# Patient Record
Sex: FEMALE | Race: WHITE | NOT HISPANIC OR LATINO | URBAN - METROPOLITAN AREA
[De-identification: names, ages, dates, MRNs, and addresses within clinical notes are randomized per-mention and may not be internally consistent; named-entity substitution may affect disease eponyms.]

---

## 2024-04-29 ENCOUNTER — OFFICE (OUTPATIENT)
Dept: URBAN - METROPOLITAN AREA CLINIC 11 | Facility: CLINIC | Age: 29
End: 2024-04-29

## 2024-04-29 VITALS
SYSTOLIC BLOOD PRESSURE: 147 MMHG | WEIGHT: 181 LBS | OXYGEN SATURATION: 93 % | DIASTOLIC BLOOD PRESSURE: 88 MMHG | DIASTOLIC BLOOD PRESSURE: 74 MMHG | HEART RATE: 99 BPM | SYSTOLIC BLOOD PRESSURE: 153 MMHG | HEIGHT: 65 IN

## 2024-04-29 DIAGNOSIS — K92.1 MELENA: ICD-10-CM

## 2024-04-29 DIAGNOSIS — R45.89 OTHER SYMPTOMS AND SIGNS INVOLVING EMOTIONAL STATE: ICD-10-CM

## 2024-04-29 DIAGNOSIS — Z91.018 ALLERGY TO OTHER FOODS: ICD-10-CM

## 2024-04-29 DIAGNOSIS — R10.32 LEFT LOWER QUADRANT PAIN: ICD-10-CM

## 2024-04-29 DIAGNOSIS — K64.9 UNSPECIFIED HEMORRHOIDS: ICD-10-CM

## 2024-04-29 PROCEDURE — 99204 OFFICE O/P NEW MOD 45 MIN: CPT | Performed by: STUDENT IN AN ORGANIZED HEALTH CARE EDUCATION/TRAINING PROGRAM

## 2024-04-29 RX ORDER — SODIUM PICOSULFATE, MAGNESIUM OXIDE, AND ANHYDROUS CITRIC ACID 12; 3.5; 1 G/175ML; G/175ML; MG/175ML
LIQUID ORAL
Qty: 1 | Refills: 0 | Status: COMPLETED
Start: 2024-04-29 | End: 2024-07-18

## 2024-04-29 RX ORDER — HYDROCORTISONE ACETATE 25 MG/1
SUPPOSITORY RECTAL
Qty: 14 | Refills: 1 | Status: COMPLETED
Start: 2024-04-29 | End: 2024-05-10

## 2024-04-29 NOTE — SERVICENOTES
We will schedule the patient for diagnostic colonoscopy given her new onset hematochezia.  I am going to treat her empirically for hemorrhoidal bleeding with some Anusol suppositories.  Given her multiple food allergies I am also going to check her for celiac disease, blood counts, and liver enzymes.  Discussed with the patient the risks and benefits of the procedure and she agrees to proceed.  Her left lower quadrant pain is not really bothering her unless she presses on her abdomen very deeply so I think that has keep an eye on this and see what her colonoscopy shows then we can use dicyclomine as needed.  Return to clinic in 6 months or sooner if needed.  I highly recommended that she see a primary care doctor to help treat her anxiety as I think she could benefit from this greatly.

## 2024-04-29 NOTE — SERVICEHPINOTES
Ms. Tania Carrillo is a 29-year-old white female with no significant past medical history, who presents to [place] [address] for hematochezia.  
rand
br Clinic visit 04/29/2024: 
br Patient reports that for the last 2 years she has had on and off rectal bleeding which has gotten worse over the last few weeks.  She also has some left lower quadrant pain that is really worrying her but it is only when she presses very firmly and deeply on her left lower quadrant.  Normally it does not really bother her during her everyday life.  She never had surgery on her abdomen.  No family history of GI malignancies or liver disease.  No previous EGD or colonoscopy.  She does smoke marijuana about 3 times a day and has lot of anxiety about her health.  She does feel some protrusions on the outside of her bottom suspicious for hemorrhoids.  She has allergies to oats, barley, or beans, strawberries, and walnuts so she finds it hard to do a high-fiber diet.  She drinks alcohol occasionally.  She does not have any constipation and has about 3 bowel movements per day but sometimes has to strain.

## 2024-05-31 ENCOUNTER — AMBULATORY SURGICAL CENTER (OUTPATIENT)
Dept: URBAN - METROPOLITAN AREA SURGERY 3 | Facility: SURGERY | Age: 29
End: 2024-05-31
Payer: COMMERCIAL

## 2024-05-31 ENCOUNTER — OFFICE (OUTPATIENT)
Dept: URBAN - METROPOLITAN AREA PATHOLOGY 12 | Facility: PATHOLOGY | Age: 29
End: 2024-05-31

## 2024-05-31 VITALS
SYSTOLIC BLOOD PRESSURE: 100 MMHG | HEART RATE: 90 BPM | SYSTOLIC BLOOD PRESSURE: 114 MMHG | OXYGEN SATURATION: 99 % | TEMPERATURE: 98.7 F | OXYGEN SATURATION: 100 % | RESPIRATION RATE: 14 BRPM | SYSTOLIC BLOOD PRESSURE: 100 MMHG | DIASTOLIC BLOOD PRESSURE: 68 MMHG | DIASTOLIC BLOOD PRESSURE: 73 MMHG | RESPIRATION RATE: 16 BRPM | RESPIRATION RATE: 16 BRPM | HEART RATE: 102 BPM | WEIGHT: 177.6 LBS | DIASTOLIC BLOOD PRESSURE: 73 MMHG | HEART RATE: 99 BPM | DIASTOLIC BLOOD PRESSURE: 73 MMHG | HEART RATE: 89 BPM | TEMPERATURE: 98.7 F | RESPIRATION RATE: 17 BRPM | SYSTOLIC BLOOD PRESSURE: 100 MMHG | SYSTOLIC BLOOD PRESSURE: 130 MMHG | HEIGHT: 65 IN | RESPIRATION RATE: 16 BRPM | DIASTOLIC BLOOD PRESSURE: 68 MMHG | OXYGEN SATURATION: 98 % | HEART RATE: 90 BPM | TEMPERATURE: 98.7 F | SYSTOLIC BLOOD PRESSURE: 132 MMHG | HEART RATE: 99 BPM | OXYGEN SATURATION: 100 % | RESPIRATION RATE: 14 BRPM | RESPIRATION RATE: 15 BRPM | HEART RATE: 99 BPM | DIASTOLIC BLOOD PRESSURE: 83 MMHG | HEART RATE: 102 BPM | WEIGHT: 177.6 LBS | RESPIRATION RATE: 14 BRPM | DIASTOLIC BLOOD PRESSURE: 83 MMHG | HEART RATE: 96 BPM | SYSTOLIC BLOOD PRESSURE: 132 MMHG | SYSTOLIC BLOOD PRESSURE: 130 MMHG | SYSTOLIC BLOOD PRESSURE: 132 MMHG | HEIGHT: 65 IN | HEART RATE: 89 BPM | SYSTOLIC BLOOD PRESSURE: 117 MMHG | DIASTOLIC BLOOD PRESSURE: 54 MMHG | SYSTOLIC BLOOD PRESSURE: 117 MMHG | SYSTOLIC BLOOD PRESSURE: 130 MMHG | SYSTOLIC BLOOD PRESSURE: 117 MMHG | DIASTOLIC BLOOD PRESSURE: 61 MMHG | HEART RATE: 90 BPM | DIASTOLIC BLOOD PRESSURE: 83 MMHG | DIASTOLIC BLOOD PRESSURE: 54 MMHG | WEIGHT: 177.6 LBS | DIASTOLIC BLOOD PRESSURE: 54 MMHG | DIASTOLIC BLOOD PRESSURE: 61 MMHG | OXYGEN SATURATION: 99 % | RESPIRATION RATE: 15 BRPM | DIASTOLIC BLOOD PRESSURE: 68 MMHG | HEART RATE: 96 BPM | SYSTOLIC BLOOD PRESSURE: 114 MMHG | RESPIRATION RATE: 17 BRPM | HEART RATE: 102 BPM | OXYGEN SATURATION: 99 % | DIASTOLIC BLOOD PRESSURE: 61 MMHG | RESPIRATION RATE: 17 BRPM | OXYGEN SATURATION: 98 % | OXYGEN SATURATION: 100 % | HEIGHT: 65 IN | HEART RATE: 96 BPM | SYSTOLIC BLOOD PRESSURE: 114 MMHG | RESPIRATION RATE: 15 BRPM | OXYGEN SATURATION: 98 % | HEART RATE: 89 BPM

## 2024-05-31 DIAGNOSIS — K63.5 POLYP OF COLON: ICD-10-CM

## 2024-05-31 DIAGNOSIS — K92.1 MELENA: ICD-10-CM

## 2024-05-31 PROCEDURE — 88305 TISSUE EXAM BY PATHOLOGIST: CPT | Performed by: STUDENT IN AN ORGANIZED HEALTH CARE EDUCATION/TRAINING PROGRAM

## 2024-05-31 PROCEDURE — 45380 COLONOSCOPY AND BIOPSY: CPT | Performed by: STUDENT IN AN ORGANIZED HEALTH CARE EDUCATION/TRAINING PROGRAM

## 2024-05-31 NOTE — SERVICEHPINOTES
Ms. Tania Carrillo is a 29-year-old white female with no significant past medical history, who initially presented to [place] [address] for hematochezia. Clinic visit 04/29/2024: brPatient reports that for the last 2 years she has had on and off rectal bleeding which has gotten worse over the last few weeks.  She also has some left lower quadrant pain that is really worrying her but it is only when she presses very firmly and deeply on her left lower quadrant.  Normally it does not really bother her during her everyday life.  She never had surgery on her abdomen.  No family history of GI malignancies or liver disease.  No previous EGD or colonoscopy.  She does smoke marijuana about 3 times a day and has lot of anxiety about her health.  She does feel some protrusions on the outside of her bottom suspicious for hemorrhoids.  She has allergies to oats, barley, or beans, strawberries, and walnuts so she finds it hard to do a high-fiber diet.  She drinks alcohol occasionally.  She does not have any constipation and has about 3 bowel movements per day but sometimes has to strain.
br
br   Colonoscopy 05/31/2024:  
rand
no bleeding since starting the prep, no PSH, no FHx of colon cancer

## 2024-05-31 NOTE — SERVICEHPINOTES
Ms. Tania Carrillo is a 29-year-old white female with no significant past medical history, who initially presented to [place] [address] for hematochezia. Clinic visit 04/29/2024: brPatient reports that for the last 2 years she has had on and off rectal bleeding which has gotten worse over the last few weeks.  She also has some left lower quadrant pain that is really worrying her but it is only when she presses very firmly and deeply on her left lower quadrant.  Normally it does not really bother her during her everyday life.  She never had surgery on her abdomen.  No family history of GI malignancies or liver disease.  No previous EGD or colonoscopy.  She does smoke marijuana about 3 times a day and has lot of anxiety about her health.  She does feel some protrusions on the outside of her bottom suspicious for hemorrhoids.  She has allergies to oats, barley, or beans, strawberries, and walnuts so she finds it hard to do a high-fiber diet.  She drinks alcohol occasionally.  She does not have any constipation and has about 3 bowel movements per day but sometimes has to strain.
br
br   Colonoscopy 05/31/2024:  
rand
no bleeding since starting the prep, no PSH, no FHx of colon cancer

## 2024-05-31 NOTE — SERVICEHPINOTES
Ms. Tania Carrillo is a 29-year-old white female with no significant past medical history, who initially presented to [place] [address] for hematochezia. Clinic visit 04/29/2024: brPatient reports that for the last 2 years she has had on and off rectal bleeding which has gotten worse over the last few weeks.  She also has some left lower quadrant pain that is really worrying her but it is only when she presses very firmly and deeply on her left lower quadrant.  Normally it does not really bother her during her everyday life.  She never had surgery on her abdomen.  No family history of GI malignancies or liver disease.  No previous EGD or colonoscopy.  She does smoke marijuana about 3 times a day and has lot of anxiety about her health.  She does feel some protrusions on the outside of her bottom suspicious for hemorrhoids.  She has allergies to oats, barley, or beans, strawberries, and walnuts so she finds it hard to do a high-fiber diet.  She drinks alcohol occasionally.  She does not have any constipation and has about 3 bowel movements per day but sometimes has to strain.
br
br   Colonoscopy 05/31/2024:  
rand
no bleeding since starting the prep, no PSH, no FHx of colon cancer

## 2024-06-03 LAB
GASTRO ONE PATHOLOGY: PDF REPORT: (no result)

## 2024-07-18 ENCOUNTER — OFFICE (OUTPATIENT)
Dept: URBAN - METROPOLITAN AREA CLINIC 11 | Facility: CLINIC | Age: 29
End: 2024-07-18

## 2024-07-18 VITALS
WEIGHT: 180 LBS | OXYGEN SATURATION: 99 % | HEART RATE: 85 BPM | HEIGHT: 65 IN | SYSTOLIC BLOOD PRESSURE: 137 MMHG | DIASTOLIC BLOOD PRESSURE: 87 MMHG

## 2024-07-18 DIAGNOSIS — R45.89 OTHER SYMPTOMS AND SIGNS INVOLVING EMOTIONAL STATE: ICD-10-CM

## 2024-07-18 DIAGNOSIS — K75.81 NONALCOHOLIC STEATOHEPATITIS (NASH): ICD-10-CM

## 2024-07-18 DIAGNOSIS — K64.9 UNSPECIFIED HEMORRHOIDS: ICD-10-CM

## 2024-07-18 PROCEDURE — 99214 OFFICE O/P EST MOD 30 MIN: CPT

## 2024-07-18 NOTE — SERVICEHPINOTES
Ms. Tania Carrillo is a 29-year-old white female with no significant past medical history, who presents to [place] [address] for hematochezia. Clinic visit 04/29/2024: brPatient reports that for the last 2 years she has had on and off rectal bleeding which has gotten worse over the last few weeks. She also has some left lower quadrant pain that is really worrying her but it is only when she presses very firmly and deeply on her left lower quadrant. Normally it does not really bother her during her everyday life. She never had surgery on her abdomen. No family history of GI malignancies or liver disease. No previous EGD or colonoscopy. She does smoke marijuana about 3 times a day and has lot of anxiety about her health. She does feel some protrusions on the outside of her bottom suspicious for hemorrhoids. She has allergies to oats, barley, or beans, strawberries, and walnuts so she finds it hard to do a high-fiber diet. She drinks alcohol occasionally. She does not have any constipation and has about 3 bowel movements per day but sometimes has to strain.
rand gordillo Colonoscopy 5/31/2024:
br2 Polyps (2 mm to 3 mm) in the transverse colon- Benign tissue. Internal and external hemorrhoids.
rand gordillo Clinic Visit 7/18/2024: 
Chris is here today for evaluation of nonalcoholic fatty liver which was an incidental finding on abdominal ultrasound.   She recently was diagnosed with left ovarian cyst which has since ruptured and she no longer has left lower abdominal pain.  She endorses 2-3 formed bowel movements per day.  She still has some rectal bleeding from the hemorrhoids. The hemorrhoidal medications that have been sent in for her have not been covered under her insurance so she has been using over-the-counter medication.  She denies any nausea vomiting or abdominal pain.  She denies any dyspepsia or dysphagia. She drinks alcohol maybe 1-2 times per week and smokes marijuana 2-3 times per day.   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 No family history of liver disease.rand gordillo

## 2024-07-18 NOTE — SERVICENOTES
Will do a chronic liver disease workup to evaluate with hepatic steatosis.  her liver enzymes were normal in April.   Her celiac panel was negative in April as well.  She can continue using hydrocortisone for her external hemorrhoids.  Continue avoiding NSAIDs.  Discussed weight loss and alcohol reduction though she does not drink often.  We will have her follow-up in 6 months or sooner if needed.

## 2024-07-22 LAB
ACTIN (SMOOTH MUSCLE) ANTIBODY: 3 UNITS (ref 0–19)
ACUTE HEPATITIS: HBSAG SCREEN: NEGATIVE
ACUTE HEPATITIS: HCV AB: NON REACTIVE
ACUTE HEPATITIS: HEP A AB, IGM: NEGATIVE
ACUTE HEPATITIS: HEP B CORE AB, IGM: NEGATIVE
ANA BY IFA RFX TITER/PATTERN: NEGATIVE
CBC, PLATELET, NO DIFFERENTIAL: HEMATOCRIT: 42.3 % (ref 34–46.6)
CBC, PLATELET, NO DIFFERENTIAL: HEMOGLOBIN: 13.6 G/DL (ref 11.1–15.9)
CBC, PLATELET, NO DIFFERENTIAL: MCH: 27.3 PG (ref 26.6–33)
CBC, PLATELET, NO DIFFERENTIAL: MCHC: 32.2 G/DL (ref 31.5–35.7)
CBC, PLATELET, NO DIFFERENTIAL: MCV: 85 FL (ref 79–97)
CBC, PLATELET, NO DIFFERENTIAL: PLATELETS: 403 X10E3/UL (ref 150–450)
CBC, PLATELET, NO DIFFERENTIAL: RBC: 4.98 X10E6/UL (ref 3.77–5.28)
CBC, PLATELET, NO DIFFERENTIAL: RDW: 12.1 % (ref 11.7–15.4)
CBC, PLATELET, NO DIFFERENTIAL: WBC: 12.5 X10E3/UL — HIGH (ref 3.4–10.8)
CERULOPLASMIN: 30.1 MG/DL (ref 19–39)
COMP. METABOLIC PANEL (14): ALBUMIN: 4.8 G/DL (ref 4–5)
COMP. METABOLIC PANEL (14): ALKALINE PHOSPHATASE: 68 IU/L (ref 44–121)
COMP. METABOLIC PANEL (14): ALT (SGPT): 24 IU/L (ref 0–32)
COMP. METABOLIC PANEL (14): AST (SGOT): 16 IU/L (ref 0–40)
COMP. METABOLIC PANEL (14): BILIRUBIN, TOTAL: <0.2 MG/DL
COMP. METABOLIC PANEL (14): BUN/CREATININE RATIO: 14 (ref 9–23)
COMP. METABOLIC PANEL (14): BUN: 9 MG/DL (ref 6–20)
COMP. METABOLIC PANEL (14): CALCIUM: 9.9 MG/DL (ref 8.7–10.2)
COMP. METABOLIC PANEL (14): CARBON DIOXIDE, TOTAL: 21 MMOL/L (ref 20–29)
COMP. METABOLIC PANEL (14): CHLORIDE: 103 MMOL/L (ref 96–106)
COMP. METABOLIC PANEL (14): CREATININE: 0.66 MG/DL (ref 0.57–1)
COMP. METABOLIC PANEL (14): EGFR: 122 ML/MIN/1.73 (ref 59–?)
COMP. METABOLIC PANEL (14): GLOBULIN, TOTAL: 2.8 G/DL (ref 1.5–4.5)
COMP. METABOLIC PANEL (14): GLUCOSE: 90 MG/DL (ref 70–99)
COMP. METABOLIC PANEL (14): POTASSIUM: 4.6 MMOL/L (ref 3.5–5.2)
COMP. METABOLIC PANEL (14): PROTEIN, TOTAL: 7.6 G/DL (ref 6–8.5)
COMP. METABOLIC PANEL (14): SODIUM: 139 MMOL/L (ref 134–144)
FE+TIBC+FER: FERRITIN: 24 NG/ML (ref 15–150)
FE+TIBC+FER: IRON BIND.CAP.(TIBC): 443 UG/DL (ref 250–450)
FE+TIBC+FER: IRON SATURATION: 9 % — CRITICAL LOW (ref 15–55)
FE+TIBC+FER: IRON: 39 UG/DL (ref 27–159)
FE+TIBC+FER: UIBC: 404 UG/DL (ref 131–425)
HEP A AB, TOTAL: NEGATIVE
HEP B SURFACE AB, QUAL: REACTIVE
INTERPRETATION: (no result)
MITOCHONDRIAL (M2) ANTIBODY: <20 UNITS

## 2024-07-24 ENCOUNTER — OFFICE (OUTPATIENT)
Dept: URBAN - METROPOLITAN AREA CLINIC 11 | Facility: CLINIC | Age: 29
End: 2024-07-24

## 2024-07-24 VITALS
DIASTOLIC BLOOD PRESSURE: 103 MMHG | HEIGHT: 65 IN | HEART RATE: 137 BPM | SYSTOLIC BLOOD PRESSURE: 161 MMHG | WEIGHT: 177 LBS | OXYGEN SATURATION: 97 %

## 2024-07-24 DIAGNOSIS — K62.89 OTHER SPECIFIED DISEASES OF ANUS AND RECTUM: ICD-10-CM

## 2024-07-24 DIAGNOSIS — K64.9 UNSPECIFIED HEMORRHOIDS: ICD-10-CM

## 2024-07-24 DIAGNOSIS — K60.2 ANAL FISSURE, UNSPECIFIED: ICD-10-CM

## 2024-07-24 PROCEDURE — 99214 OFFICE O/P EST MOD 30 MIN: CPT

## 2024-07-24 NOTE — SERVICENOTES
Large external hemorrhoid noted with small ulceration on the end of it.  Possibly an anal fissure due to the extreme pain during defecation so will send her in nitro/lidocaine.  Also referring her to   who agreed to work the patient  into clinic soon. Encouraged her to use stool softener, MiraLax and drink plenty of water to help reduce irritation on the hemorrhoids.  All questions addressed will have her follow-up in 8 weeks to recheck labs to see if her iron saturation has improved.

## 2025-01-29 ENCOUNTER — OFFICE (OUTPATIENT)
Dept: URBAN - METROPOLITAN AREA CLINIC 11 | Facility: CLINIC | Age: 30
End: 2025-01-29
Payer: COMMERCIAL

## 2025-01-29 VITALS
HEART RATE: 100 BPM | DIASTOLIC BLOOD PRESSURE: 71 MMHG | HEIGHT: 65 IN | WEIGHT: 183 LBS | OXYGEN SATURATION: 99 % | SYSTOLIC BLOOD PRESSURE: 123 MMHG

## 2025-01-29 DIAGNOSIS — Z87.19 PERSONAL HISTORY OF OTHER DISEASES OF THE DIGESTIVE SYSTEM: ICD-10-CM

## 2025-01-29 DIAGNOSIS — K64.9 UNSPECIFIED HEMORRHOIDS: ICD-10-CM

## 2025-01-29 PROCEDURE — 99213 OFFICE O/P EST LOW 20 MIN: CPT

## 2025-01-29 NOTE — SERVICENOTES
patient is doing very well and her anal fissure has resolved and her hemorrhoids are no longer inflamed.  She just has a small skin tag that she feels on occasion.  She will occasionally use the nitroglycerin cream if she feels any discomfort after a bowel movement.  Otherwise she has no GI complaints.  We will have her follow-up as needed.  Encouraged her to eat a high-fiber diet  and use MiraLax as needed for any constipation.  Counseled her on NSAID use.  All questions addressed.

## 2025-01-29 NOTE — SERVICEHPINOTES
Ms. Tania Carrillo is a 29-year-old white female with no significant past medical history, who presents to [place] [address] for follow-up of anal fissure and hemorrhoids.Clinic visit 04/29/2024:Chris reports that for the last 2 years she has had on and off rectal bleeding which has gotten worse over the last few weeks. She also has some left lower quadrant pain that is really worrying her but it is only when she presses very firmly and deeply on her left lower quadrant. Normally it does not really bother her during her everyday life. She never had surgery on her abdomen. No family history of GI malignancies or liver disease. No previous EGD or colonoscopy. She does smoke marijuana about 3 times a day and has lot of anxiety about her health. She does feel some protrusions on the outside of her bottom suspicious for hemorrhoids. She has allergies to oats, barley, or beans, strawberries, and walnuts so she finds it hard to do a high-fiber diet. She drinks alcohol occasionally. She does not have any constipation and has about 3 bowel movements per day but sometimes has to strain.Colonoscopy 5/31/2024:br2 Polyps (2 mm to 3 mm) in the transverse colon- Benign tissue. Internal and external hemorrhoids.Clinic Visit 7/18/2024:Chris is here today for evaluation of nonalcoholic fatty liver which was an incidental finding on abdominal ultrasound. She recently was diagnosed with left ovarian cyst which has since ruptured and she no longer has left lower abdominal pain. She endorses 2-3 formed bowel movements per day. She still has some rectal bleeding from the hemorrhoids. The hemorrhoidal medications that have been sent in for her have not been covered under her insurance so she has been using over-the-counter medication. She denies any nausea vomiting or abdominal pain. She denies any dyspepsia or dysphagia. She drinks alcohol maybe 1-2 times per week and smokes marijuana 2-3 times per day. 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 No family history of liver disease.Clinic Visit 7/24/2024:Chris is here today with her sister after calling earlier in the week with complaints of excruciating hemorrhoidal pain. She reports on Saturday her dog got out of the house so she ran after him and ever since then her external hemorrhoid has been extremely painful. She has been soaking in Sitz baths every hour and applying over-the-counter ointments that are not relieving the pain. This morning she reports having 1 bowel movement without straining and there was blood noted in the toilet. A few hours later she felt like she had to have another bowel movement but reports no stool came out but blood continued to run out of her rectum. Recently started her on oral iron for a critical iron saturation count that was checked at her last visit. Have encouraged her to ensure she drinks plenty of water, use MiraLax and a stool softener as needed to prevent constipation that could be caused by the iron. She will follow-up in 8 weeks for lab recheck.Clinic Visit 9/25/2024:brThe patient reports significant improvement in her condition. She notes that the hemorrhoid has shrunk considerably, and a second one has developed but is not painful. She has been using lidocaine and nitroglycerin gel twice daily, which has been beneficial for the anal fissure. She has also made dietary changes, increasing fiber intake and hydration, which has helped alleviate symptoms. She had a consultation with Dr. Mitchell a few days after her last appointment in July who offered surgical options, but she was not comfortable with him performing the surgery. She sought a second opinion from Dr. Shea yesterday, who confirmed the presence of the nearly healed anal fissure, the reduction in the size of the original hemorrhoid and the presence of a new one. She was advised to increase the application of the NTG to three or four times daily. She endorses bowel movements 1-2 times per day. She saw some blood on the tissue after a hard bowel movement a couple of weeks ago, but other than that has not had hematochezia or melena. She denies any abdominal pain, nausea, or vomiting. Bowel movements are not painful. She also mentioned replacing her low toilet with a higher one, which she believes has helped reduce pressure on the hemorrhoids. No dyspepsia or dysphagia.
[facility]    Clinic Visit 1/29/2025:
Jluis patient presents for follow-up for hemorrhoids and an anal fissure. She reports she is doing well. She has a small skin tag that will occasionally cause irritation. There has been no significant bleeding recently, only scant blood noticed one or two times associated with straining during bowel movements, described as 'just on the toilet tissue.' No painful defecation. Bowel movements occur once or twice a day without significant constipation. She has not used Miralax recently. She has attempted to modify her diet to include more fiber, although she feels her weight does not reflect these changes. No abdominal pain, nausea, vomiting, heartburn, acid reflux, or trouble swallowing.